# Patient Record
Sex: FEMALE | Race: WHITE | NOT HISPANIC OR LATINO | URBAN - METROPOLITAN AREA
[De-identification: names, ages, dates, MRNs, and addresses within clinical notes are randomized per-mention and may not be internally consistent; named-entity substitution may affect disease eponyms.]

---

## 2021-06-15 ENCOUNTER — OFFICE (OUTPATIENT)
Dept: URBAN - METROPOLITAN AREA CLINIC 67 | Facility: CLINIC | Age: 61
End: 2021-06-15

## 2021-06-15 VITALS
WEIGHT: 171 LBS | HEIGHT: 60 IN | OXYGEN SATURATION: 98 % | TEMPERATURE: 96.8 F | RESPIRATION RATE: 16 BRPM | HEART RATE: 75 BPM

## 2021-06-15 DIAGNOSIS — R19.8 OTHER SPECIFIED SYMPTOMS AND SIGNS INVOLVING THE DIGESTIVE S: ICD-10-CM

## 2021-06-15 DIAGNOSIS — R10.30 LOWER ABDOMINAL PAIN, UNSPECIFIED: ICD-10-CM

## 2021-06-15 DIAGNOSIS — R14.0 ABDOMINAL DISTENSION (GASEOUS): ICD-10-CM

## 2021-06-15 PROCEDURE — 99214 OFFICE O/P EST MOD 30 MIN: CPT

## 2021-06-15 NOTE — SERVICENOTES
Discussed case with Dr. Francis,                                                                   Pt agrees with and states understanding of plan

## 2021-06-15 NOTE — SERVICEHPINOTES
Nadja Livingston   is seen today for a follow-up visit.    She presents today complaining of a change in bowel habits and lower abdominal pain for the past 3 weeks. She describes abdominal pain as intermittent and dull with associated bloating. She states BMs are "difficult to pass" as if "something is in the way." She states stools are different shapes as well, sometimes flat and other times small pieces. She states she has a daily BM, sometimes large and others small. She denies GI tract bleeding or unintentional weight loss. 6/1/21 [facility] abdominal x-ray: Nonobstructive bowel gas patter, mod to large amount of retained stool in colon. BR

## 2021-06-29 ENCOUNTER — AMBULATORY SURGICAL CENTER (OUTPATIENT)
Dept: URBAN - METROPOLITAN AREA SURGERY 19 | Facility: SURGERY | Age: 61
End: 2021-06-29

## 2021-06-29 DIAGNOSIS — R19.5 OTHER FECAL ABNORMALITIES: ICD-10-CM

## 2021-06-29 DIAGNOSIS — K64.8 OTHER HEMORRHOIDS: ICD-10-CM

## 2021-06-29 DIAGNOSIS — K62.89 OTHER SPECIFIED DISEASES OF ANUS AND RECTUM: ICD-10-CM

## 2021-06-29 DIAGNOSIS — K62.5 HEMORRHAGE OF ANUS AND RECTUM: ICD-10-CM

## 2021-06-29 LAB
FSIG STUDY: (no result)
FSIG STUDY: (no result)

## 2021-06-29 PROCEDURE — 45330 DIAGNOSTIC SIGMOIDOSCOPY: CPT | Performed by: INTERNAL MEDICINE

## 2021-08-17 ENCOUNTER — OFFICE (OUTPATIENT)
Dept: URBAN - METROPOLITAN AREA CLINIC 67 | Facility: CLINIC | Age: 61
End: 2021-08-17
Payer: COMMERCIAL

## 2021-08-17 VITALS
DIASTOLIC BLOOD PRESSURE: 74 MMHG | HEART RATE: 85 BPM | SYSTOLIC BLOOD PRESSURE: 116 MMHG | WEIGHT: 168 LBS | RESPIRATION RATE: 14 BRPM | HEIGHT: 60 IN

## 2021-08-17 DIAGNOSIS — K58.9 IRRITABLE BOWEL SYNDROME WITHOUT DIARRHEA: ICD-10-CM

## 2021-08-17 DIAGNOSIS — Z86.010 PERSONAL HISTORY OF COLONIC POLYPS: ICD-10-CM

## 2021-08-17 DIAGNOSIS — K22.70 BARRETT'S ESOPHAGUS WITHOUT DYSPLASIA: ICD-10-CM

## 2021-08-17 PROCEDURE — 99213 OFFICE O/P EST LOW 20 MIN: CPT | Performed by: INTERNAL MEDICINE

## 2021-08-17 RX ORDER — DICYCLOMINE HYDROCHLORIDE 10 MG/1
CAPSULE ORAL
Qty: 40 | Refills: 3 | Status: ACTIVE
Start: 2021-07-09

## 2021-08-17 NOTE — SERVICEHPINOTES
The patient is seen today for follow-up regarding a personal history of barretts esophagus and colon polyps.Her most recent EGD and colonoscopy were done on 5/20/19 - her EGD was remarkable for an irregular Z-line at the GE junction and a small hiatal hernia - biopsies of the GE junction demonstrated barretts metaplasia without dysplasia.brHer colonoscopy was remarkable for a small splenic flexure polyp (sessile serrated adenoma), sigmoid diverticulosis and mild internal hemorrhoids.In the interim since her last appointment, she underwent a flexible sigmoidoscopy on 6/29/21 secondary to rectal pain, rectal bleeding and change in stool caliber - her exam to 15cm was only remarkable for mild internal hemorrhoids.brShe also underwent a contrasted CT of the abd/pelvis on 7/7/21 (secondary to generalized abdominal pain) which was without any acute abnormality. brAfter her CT scan, I placed her on a trial of Dicyclomine for suspected IBS/intestinal spasm.Today, she reports that her abdominal discomfort is much improved with use of the Dicyclomine which she usually takes every few days. She denies any GI tract bleeding symptoms and reports her appetite has been good. She reports that she takes an OTC generic acid suppression medication 1-2 times daily. gordon

## 2021-08-17 NOTE — SERVICENOTES
I will have her stay on the Dicyclomine as needed (will refill today) and have her return to see me for follow-up in 6 months or sooner if needed. 
She will be due for a surveillance EGD in 2022 and a surveillance colonoscopy in 2024

## 2022-05-24 ENCOUNTER — OFFICE (OUTPATIENT)
Dept: URBAN - METROPOLITAN AREA CLINIC 67 | Facility: CLINIC | Age: 62
End: 2022-05-24
Payer: COMMERCIAL

## 2022-05-24 VITALS
DIASTOLIC BLOOD PRESSURE: 74 MMHG | HEART RATE: 90 BPM | HEIGHT: 60 IN | SYSTOLIC BLOOD PRESSURE: 120 MMHG | WEIGHT: 175 LBS

## 2022-05-24 DIAGNOSIS — K58.9 IRRITABLE BOWEL SYNDROME WITHOUT DIARRHEA: ICD-10-CM

## 2022-05-24 DIAGNOSIS — K22.70 BARRETT'S ESOPHAGUS WITHOUT DYSPLASIA: ICD-10-CM

## 2022-05-24 DIAGNOSIS — Z86.010 PERSONAL HISTORY OF COLONIC POLYPS: ICD-10-CM

## 2022-05-24 PROCEDURE — 99214 OFFICE O/P EST MOD 30 MIN: CPT | Performed by: INTERNAL MEDICINE

## 2022-05-24 RX ORDER — DICYCLOMINE HYDROCHLORIDE 10 MG/1
CAPSULE ORAL
Qty: 40 | Refills: 3 | Status: ACTIVE
Start: 2021-07-09

## 2022-05-24 NOTE — SERVICEHPINOTES
The patient is seen today for follow-up regarding a personal history of barretts esophagus, suspected IBS and personal history of colon polyps.Her most recent EGD and colonoscopy were done on 5/20/19 - her EGD was remarkable for an irregular Z-line at the GE junction and a small hiatal hernia - biopsies of the GE junction demonstrated barretts metaplasia without dysplasia.brHer colonoscopy was remarkable for a small splenic flexure polyp (sessile serrated adenoma), sigmoid diverticulosis and mild internal hemorrhoids.She underwent a flexible sigmoidoscopy on 6/29/21 secondary to rectal pain, rectal bleeding and change in stool caliber - her exam to 15cm was only remarkable for mild internal hemorrhoids.brShe also underwent a contrasted CT of the abd/pelvis on 7/7/21 (secondary to generalized abdominal pain) which was without any acute abnormality.brAfter her CT scan, I placed her on a trial of Dicyclomine for suspected IBS/intestinal spasm which she felt helped with her abdominal discomfort. Today, she reports that she has been dealing with lumbar back pain that occurred after lifting some heavy groceries which has exacerbated her IBS symptoms - she has been using the Dicyclomine as needed but has run out and needs a refill. She denies any GI tract bleeding symptoms, dysphagia/odynophagia or emesis - she reports occasional heartburn but it responds well to OTC antacids as needed.

## 2022-05-24 NOTE — SERVICENOTES
We will plan for a surveillance EGD and I will refill her Dicyclomine today - she will be due for a surveillance colonoscopy in 2024. I encouraged her to follow-up with her PCP if her lumbar back pain does not improve.

## 2022-06-27 ENCOUNTER — AMBULATORY SURGICAL CENTER (OUTPATIENT)
Dept: URBAN - METROPOLITAN AREA SURGERY 19 | Facility: SURGERY | Age: 62
End: 2022-06-27

## 2022-06-27 ENCOUNTER — OFFICE (OUTPATIENT)
Dept: URBAN - METROPOLITAN AREA PATHOLOGY 24 | Facility: PATHOLOGY | Age: 62
End: 2022-06-27
Payer: COMMERCIAL

## 2022-06-27 DIAGNOSIS — K22.70 BARRETT'S ESOPHAGUS WITHOUT DYSPLASIA: ICD-10-CM

## 2022-06-27 DIAGNOSIS — K44.9 DIAPHRAGMATIC HERNIA WITHOUT OBSTRUCTION OR GANGRENE: ICD-10-CM

## 2022-06-27 LAB
RELEVANT H&P ENDOSCOPY: (no result)
RELEVANT H&P ENDOSCOPY: (no result)

## 2022-06-27 PROCEDURE — 88305 TISSUE EXAM BY PATHOLOGIST: CPT | Performed by: PATHOLOGY

## 2022-06-27 PROCEDURE — 43239 EGD BIOPSY SINGLE/MULTIPLE: CPT | Performed by: INTERNAL MEDICINE

## 2023-05-31 ENCOUNTER — OFFICE (OUTPATIENT)
Dept: URBAN - METROPOLITAN AREA CLINIC 67 | Facility: CLINIC | Age: 63
End: 2023-05-31

## 2023-05-31 VITALS
HEIGHT: 60 IN | SYSTOLIC BLOOD PRESSURE: 136 MMHG | OXYGEN SATURATION: 97 % | HEART RATE: 71 BPM | WEIGHT: 167 LBS | DIASTOLIC BLOOD PRESSURE: 78 MMHG

## 2023-05-31 DIAGNOSIS — R10.84 GENERALIZED ABDOMINAL PAIN: ICD-10-CM

## 2023-05-31 DIAGNOSIS — K22.70 BARRETT'S ESOPHAGUS WITHOUT DYSPLASIA: ICD-10-CM

## 2023-05-31 DIAGNOSIS — K58.9 IRRITABLE BOWEL SYNDROME WITHOUT DIARRHEA: ICD-10-CM

## 2023-05-31 DIAGNOSIS — Z72.0 TOBACCO USE: ICD-10-CM

## 2023-05-31 DIAGNOSIS — Z86.010 PERSONAL HISTORY OF COLONIC POLYPS: ICD-10-CM

## 2023-05-31 PROCEDURE — 99214 OFFICE O/P EST MOD 30 MIN: CPT | Performed by: INTERNAL MEDICINE

## 2023-05-31 NOTE — SERVICENOTES
I suspect her more frequent episodes of generalized abdominal discomfort are due to her increased stress/anxiety but I will check the above blood work today.
For now, I will have her stay on the Dicyclomine and OTC antacids as needed - I will have her return for follow-up in 1 year and she will be due for a surveillance colonoscopy at that time. We discussed she will be due for a surveillance EGD in 6/2025.

## 2023-05-31 NOTE — SERVICEHPINOTES
Nadja Livingston   is seen today for a follow-up visit   regarding a personal history of barretts esophagus, suspected IBS and personal history of colon polyps.Her most recent EGD and colonoscopy was done in 6/2022 and was remarkable for an irregular Z-line at the GE junction and a small hiatal hernia - biopsies of the GE junction demonstrated barretts metaplasia without dysplasia.brA colonoscopy done in 5/2019 was remarkable for a small splenic flexure polyp (sessile serrated adenoma), sigmoid diverticulosis and mild internal hemorrhoids.
Mitchell underwent a flexible sigmoidoscopy on 6/29/21 secondary to rectal pain, rectal bleeding and change in stool caliber - her exam to 15cm was only remarkable for mild internal hemorrhoids. A contrasted CT of the abd/pelvis on 7/7/21 (secondary to generalized abdominal pain) which was without any acute abnormality.brAfter her CT scan, I placed her on a trial of Dicyclomine for suspected IBS/intestinal spasm which has helped with her abdominal discomfort.Today, she reports that she has been having more frequent bouts of generalized abdominal discomfort but attributes it to increased stress/anxiety. She has been taking her Bentyl as needed which helps (usually every other day) as well as OTC antacids as needed for heartburn/reflux symptoms. She denies any GI tract bleeding symptoms, dysphagia or emesis and her appetite  has been good.gordon